# Patient Record
Sex: MALE | Race: BLACK OR AFRICAN AMERICAN | NOT HISPANIC OR LATINO | URBAN - METROPOLITAN AREA
[De-identification: names, ages, dates, MRNs, and addresses within clinical notes are randomized per-mention and may not be internally consistent; named-entity substitution may affect disease eponyms.]

---

## 2021-06-30 ENCOUNTER — APPOINTMENT (RX ONLY)
Dept: URBAN - METROPOLITAN AREA CLINIC 95 | Facility: CLINIC | Age: 26
Setting detail: DERMATOLOGY
End: 2021-06-30

## 2021-06-30 DIAGNOSIS — L28.0 LICHEN SIMPLEX CHRONICUS: ICD-10-CM | Status: INADEQUATELY CONTROLLED

## 2021-06-30 DIAGNOSIS — Z00.8 ENCOUNTER FOR OTHER GENERAL EXAMINATION: ICD-10-CM

## 2021-06-30 PROCEDURE — 99203 OFFICE O/P NEW LOW 30 MIN: CPT

## 2021-06-30 PROCEDURE — ? TREATMENT REGIMEN

## 2021-06-30 PROCEDURE — 99072 ADDL SUPL MATRL&STAF TM PHE: CPT

## 2021-06-30 PROCEDURE — ? PHE RELATED SUPPLIES PROVIDED/DISPENSED

## 2021-06-30 PROCEDURE — ? COUNSELING

## 2021-06-30 PROCEDURE — ? PRESCRIPTION

## 2021-06-30 RX ORDER — CLOBETASOL PROPIONATE 0.5 MG/G
OINTMENT TOPICAL
Qty: 1 | Refills: 1 | Status: ERX

## 2021-06-30 ASSESSMENT — LOCATION ZONE DERM: LOCATION ZONE: LEG

## 2021-06-30 ASSESSMENT — SEVERITY ASSESSMENT: SEVERITY: MODERATE

## 2021-06-30 ASSESSMENT — LOCATION SIMPLE DESCRIPTION DERM
LOCATION SIMPLE: LEFT PRETIBIAL REGION
LOCATION SIMPLE: RIGHT ANKLE

## 2021-06-30 ASSESSMENT — LOCATION DETAILED DESCRIPTION DERM
LOCATION DETAILED: LEFT DISTAL PRETIBIAL REGION
LOCATION DETAILED: RIGHT ANKLE

## 2021-06-30 NOTE — PROCEDURE: TREATMENT REGIMEN
Detail Level: Zone
Initiate Treatment: Clobetasol ointment twice a day on affected areas on lower legs

## 2021-06-30 NOTE — HPI: RASH
What Type Of Note Output Would You Prefer (Optional)?: Standard Output
How Severe Is Your Rash?: moderate
Is This A New Presentation, Or A Follow-Up?: Rash
Additional History: Pt states rash has been going for a while and was given eucrisa, TAC cream and some antibiotics but unsure of name. He states there no improvement.

## 2022-06-23 ENCOUNTER — APPOINTMENT (RX ONLY)
Dept: URBAN - METROPOLITAN AREA CLINIC 94 | Facility: CLINIC | Age: 27
Setting detail: DERMATOLOGY
End: 2022-06-23

## 2022-06-23 DIAGNOSIS — Z00.8 ENCOUNTER FOR OTHER GENERAL EXAMINATION: ICD-10-CM

## 2022-06-23 DIAGNOSIS — L81.0 POSTINFLAMMATORY HYPERPIGMENTATION: ICD-10-CM

## 2022-06-23 DIAGNOSIS — L28.0 LICHEN SIMPLEX CHRONICUS: ICD-10-CM

## 2022-06-23 PROCEDURE — ? PHE RELATED SUPPLIES PROVIDED/DISPENSED

## 2022-06-23 PROCEDURE — ? BIOPSY BY PUNCH METHOD

## 2022-06-23 PROCEDURE — ? PRESCRIPTION

## 2022-06-23 PROCEDURE — ? TREATMENT REGIMEN

## 2022-06-23 PROCEDURE — 11104 PUNCH BX SKIN SINGLE LESION: CPT

## 2022-06-23 PROCEDURE — ? COUNSELING

## 2022-06-23 PROCEDURE — ? SEPARATE AND IDENTIFIABLE DOCUMENTATION

## 2022-06-23 PROCEDURE — 99213 OFFICE O/P EST LOW 20 MIN: CPT | Mod: 25

## 2022-06-23 PROCEDURE — 99072 ADDL SUPL MATRL&STAF TM PHE: CPT

## 2022-06-23 RX ORDER — CLOBETASOL PROPIONATE 0.5 MG/G
OINTMENT TOPICAL
Qty: 60 | Refills: 0 | Status: ERX | COMMUNITY
Start: 2022-06-23

## 2022-06-23 RX ADMIN — CLOBETASOL PROPIONATE: 0.5 OINTMENT TOPICAL at 00:00

## 2022-06-23 ASSESSMENT — LOCATION DETAILED DESCRIPTION DERM
LOCATION DETAILED: RIGHT ANKLE
LOCATION DETAILED: LEFT DISTAL PRETIBIAL REGION
LOCATION DETAILED: RIGHT DISTAL PRETIBIAL REGION

## 2022-06-23 ASSESSMENT — LOCATION ZONE DERM: LOCATION ZONE: LEG

## 2022-06-23 ASSESSMENT — LOCATION SIMPLE DESCRIPTION DERM
LOCATION SIMPLE: RIGHT PRETIBIAL REGION
LOCATION SIMPLE: LEFT PRETIBIAL REGION
LOCATION SIMPLE: RIGHT ANKLE

## 2022-06-23 NOTE — PROCEDURE: BIOPSY BY PUNCH METHOD
Detail Level: Detailed
Was A Bandage Applied: Yes
Punch Size In Mm: 4
Biopsy Type: H and E
Anesthesia Type: 1% lidocaine with epinephrine
Anesthesia Volume In Cc: 0.5
Additional Anesthesia Volume In Cc (Will Not Render If 0): 0
Hemostasis: Dinora's
Epidermal Sutures: 4-0 Nylon
Wound Care: Petrolatum
Dressing: bandage
Suture Removal: 14 days
Patient Will Remove Sutures At Home?: No
Lab: [id number]
Lab Facility: [id number]
Consent: Written consent was obtained and risks were reviewed including but not limited to scarring, infection, bleeding, scabbing, incomplete removal, nerve damage and allergy to anesthesia.
Post-Care Instructions: I reviewed with the patient in detail post-care instructions. Patient is to keep the biopsy site dry overnight, and then apply bacitracin twice daily until healed. Patient may apply hydrogen peroxide soaks to remove any crusting.
Home Suture Removal Text: Patient was provided a home suture removal kit and will remove their sutures at home.  If they have any questions or difficulties they will call the office.
Notification Instructions: Patient will be notified of biopsy results. However, patient instructed to call the office if not contacted within 2 weeks.
Billing Type: Third-Party Bill
Information: Selecting Yes will display possible errors in your note based on the variables you have selected. This validation is only offered as a suggestion for you. PLEASE NOTE THAT THE VALIDATION TEXT WILL BE REMOVED WHEN YOU FINALIZE YOUR NOTE. IF YOU WANT TO FAX A PRELIMINARY NOTE YOU WILL NEED TO TOGGLE THIS TO 'NO' IF YOU DO NOT WANT IT IN YOUR FAXED NOTE.

## 2022-06-23 NOTE — PROCEDURE: TREATMENT REGIMEN
Continue Regimen: Clobetasol ointment twice a day on affected areas on lower legs.
Detail Level: Zone

## 2022-07-11 ENCOUNTER — APPOINTMENT (RX ONLY)
Dept: URBAN - METROPOLITAN AREA CLINIC 94 | Facility: CLINIC | Age: 27
Setting detail: DERMATOLOGY
End: 2022-07-11

## 2022-07-11 DIAGNOSIS — L20.89 OTHER ATOPIC DERMATITIS: ICD-10-CM | Status: INADEQUATELY CONTROLLED

## 2022-07-11 PROBLEM — L30.9 DERMATITIS, UNSPECIFIED: Status: ACTIVE | Noted: 2022-07-11

## 2022-07-11 PROCEDURE — ? PATHOLOGY DISCUSSION

## 2022-07-11 PROCEDURE — ? SUTURE REMOVAL (NO GLOBAL PERIOD)

## 2022-07-11 PROCEDURE — ? TREATMENT REGIMEN

## 2022-07-11 PROCEDURE — ? PRESCRIPTION

## 2022-07-11 PROCEDURE — ? COUNSELING

## 2022-07-11 PROCEDURE — 99213 OFFICE O/P EST LOW 20 MIN: CPT

## 2022-07-11 RX ORDER — BETAMETHASONE DIPROPIONATE 0.5 MG/G
OINTMENT TOPICAL
Qty: 45 | Refills: 0 | Status: ERX | COMMUNITY
Start: 2022-07-11

## 2022-07-11 RX ORDER — CRISABOROLE 20 MG/G
OINTMENT TOPICAL
Qty: 100 | Refills: 0 | Status: ERX | COMMUNITY
Start: 2022-07-11

## 2022-07-11 RX ADMIN — CRISABOROLE: 20 OINTMENT TOPICAL at 00:00

## 2022-07-11 RX ADMIN — BETAMETHASONE DIPROPIONATE: 0.5 OINTMENT TOPICAL at 00:00

## 2022-07-11 ASSESSMENT — LOCATION DETAILED DESCRIPTION DERM
LOCATION DETAILED: RIGHT DISTAL PRETIBIAL REGION
LOCATION DETAILED: RIGHT ANKLE
LOCATION DETAILED: LEFT DISTAL PRETIBIAL REGION

## 2022-07-11 ASSESSMENT — LOCATION SIMPLE DESCRIPTION DERM
LOCATION SIMPLE: RIGHT ANKLE
LOCATION SIMPLE: LEFT PRETIBIAL REGION
LOCATION SIMPLE: RIGHT PRETIBIAL REGION

## 2022-07-11 ASSESSMENT — LOCATION ZONE DERM: LOCATION ZONE: LEG

## 2022-07-11 NOTE — PROCEDURE: TREATMENT REGIMEN
Initiate Treatment: betamethasone dipropionate 0.05 % topical ointment Apply to affected areas on legs twice a day only as needed for itch.\\nEucrisa 2 % topical ointment Apply to mild affected areas on the legs twice a day as needed.
Detail Level: Zone
Plan: If no improvement is seen by next visit discussed initiating dupixent
Discontinue Regimen: Clobetasol ointment

## 2022-07-22 ENCOUNTER — RX ONLY (OUTPATIENT)
Age: 27
Setting detail: RX ONLY
End: 2022-07-22

## 2022-07-22 RX ORDER — TACROLIMUS 1 MG/G
OINTMENT TOPICAL
Qty: 30 | Refills: 0 | Status: ERX | COMMUNITY
Start: 2022-07-22

## 2022-10-27 ENCOUNTER — APPOINTMENT (RX ONLY)
Dept: URBAN - METROPOLITAN AREA CLINIC 94 | Facility: CLINIC | Age: 27
Setting detail: DERMATOLOGY
End: 2022-10-27

## 2022-10-27 VITALS — WEIGHT: 248 LBS | HEIGHT: 68 IN

## 2022-10-27 DIAGNOSIS — L20.89 OTHER ATOPIC DERMATITIS: ICD-10-CM | Status: INADEQUATELY CONTROLLED

## 2022-10-27 PROBLEM — L30.9 DERMATITIS, UNSPECIFIED: Status: ACTIVE | Noted: 2022-10-27

## 2022-10-27 PROCEDURE — ? COUNSELING

## 2022-10-27 PROCEDURE — 99213 OFFICE O/P EST LOW 20 MIN: CPT

## 2022-10-27 PROCEDURE — ? PRESCRIPTION

## 2022-10-27 PROCEDURE — ? TREATMENT REGIMEN

## 2022-10-27 RX ORDER — BETAMETHASONE DIPROPIONATE 0.5 MG/G
OINTMENT TOPICAL
Qty: 45 | Refills: 1 | Status: ERX

## 2022-10-27 RX ORDER — HYDROXYZINE HYDROCHLORIDE 25 MG/1
TABLET, FILM COATED ORAL
Qty: 60 | Refills: 1 | Status: ERX | COMMUNITY
Start: 2022-10-27

## 2022-10-27 RX ADMIN — HYDROXYZINE HYDROCHLORIDE: 25 TABLET, FILM COATED ORAL at 00:00

## 2022-10-27 ASSESSMENT — LOCATION ZONE DERM: LOCATION ZONE: LEG

## 2022-10-27 NOTE — PROCEDURE: TREATMENT REGIMEN
Initiate Treatment: hydroxyzine HCl 25 mg tablet Take 1-2 tablets at bedtime as needed for itch.
Detail Level: Zone
Plan: Recommended pt avoids scratching affected areas as much as possible
Continue Regimen: betamethasone dipropionate 0.05 % topical ointment Apply to affected areas on legs twice a day only as needed for itch.
Discontinue Regimen: Eucrisa 2 % topical ointment Apply to mild affected areas on the legs twice a day as needed.

## 2023-03-02 ENCOUNTER — APPOINTMENT (RX ONLY)
Dept: URBAN - METROPOLITAN AREA CLINIC 94 | Facility: CLINIC | Age: 28
Setting detail: DERMATOLOGY
End: 2023-03-02

## 2023-03-02 VITALS — HEIGHT: 69 IN | WEIGHT: 249.1 LBS

## 2023-03-02 DIAGNOSIS — L20.89 OTHER ATOPIC DERMATITIS: ICD-10-CM

## 2023-03-02 PROBLEM — L30.9 DERMATITIS, UNSPECIFIED: Status: ACTIVE | Noted: 2023-03-02

## 2023-03-02 PROCEDURE — ? TREATMENT REGIMEN

## 2023-03-02 PROCEDURE — ? PRESCRIPTION

## 2023-03-02 PROCEDURE — ? REFERRAL CORRESPONDENCE

## 2023-03-02 PROCEDURE — 99214 OFFICE O/P EST MOD 30 MIN: CPT

## 2023-03-02 PROCEDURE — ? COUNSELING

## 2023-03-02 PROCEDURE — ? ADDITIONAL NOTES

## 2023-03-02 RX ORDER — CLOBETASOL PROPIONATE 0.5 MG/G
OINTMENT TOPICAL
Qty: 45 | Refills: 1 | Status: ERX

## 2023-03-02 ASSESSMENT — LOCATION SIMPLE DESCRIPTION DERM
LOCATION SIMPLE: LEFT PRETIBIAL REGION
LOCATION SIMPLE: RIGHT ANKLE
LOCATION SIMPLE: RIGHT PRETIBIAL REGION

## 2023-03-02 ASSESSMENT — LOCATION DETAILED DESCRIPTION DERM
LOCATION DETAILED: LEFT DISTAL PRETIBIAL REGION
LOCATION DETAILED: RIGHT ANKLE
LOCATION DETAILED: RIGHT DISTAL PRETIBIAL REGION

## 2023-03-02 ASSESSMENT — LOCATION ZONE DERM: LOCATION ZONE: LEG

## 2023-03-02 NOTE — PROCEDURE: TREATMENT REGIMEN
Detail Level: Zone
Plan: Recommended pt avoids scratching affected areas as much as possible
Continue Regimen: clobetasol 0.05 % topical ointment Apply to affected areas on legs twice a day only as needed for itch.
Discontinue Regimen: betamethasone dipropionate 0.05 % topical ointment Apply to affected areas on legs twice a day only as needed for itch.

## 2023-03-02 NOTE — PROCEDURE: ADDITIONAL NOTES
Additional Notes: At Pts request, we will Referral for patch testing
Render Risk Assessment In Note?: yes
Detail Level: Simple
Additional Notes: Discussed initiating Dupixent however pt declines

## 2023-06-22 ENCOUNTER — APPOINTMENT (RX ONLY)
Dept: URBAN - METROPOLITAN AREA CLINIC 94 | Facility: CLINIC | Age: 28
Setting detail: DERMATOLOGY
End: 2023-06-22

## 2023-06-22 VITALS — WEIGHT: 240.97 LBS | HEIGHT: 68 IN

## 2023-06-22 DIAGNOSIS — L20.89 OTHER ATOPIC DERMATITIS: ICD-10-CM | Status: INADEQUATELY CONTROLLED

## 2023-06-22 PROBLEM — L30.9 DERMATITIS, UNSPECIFIED: Status: ACTIVE | Noted: 2023-06-22

## 2023-06-22 PROCEDURE — ? TREATMENT REGIMEN

## 2023-06-22 PROCEDURE — ? COUNSELING

## 2023-06-22 PROCEDURE — ? DUPIXENT INITIATION

## 2023-06-22 PROCEDURE — ? ADDITIONAL NOTES

## 2023-06-22 PROCEDURE — ? REFERRAL CORRESPONDENCE

## 2023-06-22 PROCEDURE — 99214 OFFICE O/P EST MOD 30 MIN: CPT

## 2023-06-22 PROCEDURE — ? PRESCRIPTION

## 2023-06-22 RX ORDER — CLOBETASOL PROPIONATE 0.5 MG/G
OINTMENT TOPICAL
Qty: 45 | Refills: 0 | Status: ERX

## 2023-06-22 RX ORDER — CRISABOROLE 20 MG/G
OINTMENT TOPICAL
Qty: 100 | Refills: 0 | Status: ERX

## 2023-06-22 ASSESSMENT — LOCATION SIMPLE DESCRIPTION DERM
LOCATION SIMPLE: RIGHT PRETIBIAL REGION
LOCATION SIMPLE: RIGHT ANKLE
LOCATION SIMPLE: LEFT PRETIBIAL REGION

## 2023-06-22 ASSESSMENT — SEVERITY ASSESSMENT 2020: SEVERITY 2020: MODERATE

## 2023-06-22 ASSESSMENT — LOCATION DETAILED DESCRIPTION DERM
LOCATION DETAILED: RIGHT ANKLE
LOCATION DETAILED: RIGHT DISTAL PRETIBIAL REGION
LOCATION DETAILED: LEFT DISTAL PRETIBIAL REGION

## 2023-06-22 ASSESSMENT — BSA ECZEMA: % BODY COVERED IN ECZEMA: 15

## 2023-06-22 ASSESSMENT — ITCH NUMERIC RATING SCALE: HOW SEVERE IS YOUR ITCHING?: 6

## 2023-06-22 ASSESSMENT — LOCATION ZONE DERM: LOCATION ZONE: LEG

## 2023-06-22 NOTE — PROCEDURE: DUPIXENT INITIATION
Is Soriatane Contraindicated?: No
Dupixent Dosing: 600 mg SC day 0 then 300 mg SC every other week
Pregnancy And Lactation Warning Text: There have not been adverse fetal risks in women taking Dupixent while pregnant. It is unknown if this medication is excreted in breast milk.
Dupixent Monitoring Guidelines: There is no laboratory monitoring requirement with Dupixent.
Diagnosis (Required): Atopic Dermatitis/Eczematous Dermatitis
Detail Level: Zone

## 2023-06-22 NOTE — PROCEDURE: TREATMENT REGIMEN
Detail Level: Zone
Plan: Recommended pt avoids scratching affected areas as much as possible
Continue Regimen: clobetasol 0.05 % topical ointment Apply to affected areas on legs twice a day only as needed for itch.
Discontinue Regimen: betamethasone dipropionate 0.05 % topical ointment Apply to affected areas on legs twice a day only as needed for itch.
Initiate Treatment: Dupixent: \\nInitial loading dose: 600 mg SI (300 mg/2 mL) injections subcutaneously on Day 1 \\nMaintenance dose: 300 mg SI (300 mg/2 mL) injection subcutaneously every 2 weeks, starting on Day 15
Continue Regimen: clobetasol 0.05 % topical: Apply to affected areas on legs twice a day only as needed for itch.\\n\\nEucrisa 2 % topical ointment: 1 application twice a day to mild affected areas on legs as needed for itch.

## 2023-06-22 NOTE — PROCEDURE: ADDITIONAL NOTES
Additional Notes: Referred to Dr. Armas for patch testing
Render Risk Assessment In Note?: yes
Detail Level: Simple
Additional Notes: Atopic dermatitis affects patient quality of life by interfering with sleep and work due to constant itchiness and cracking of skin.\\nBSA: 15%\\nPGA: moderate.

## 2023-06-26 ENCOUNTER — RX ONLY (OUTPATIENT)
Age: 28
Setting detail: RX ONLY
End: 2023-06-26

## 2023-06-26 RX ORDER — DUPILUMAB 300 MG/2ML
INJECTION, SOLUTION SUBCUTANEOUS
Qty: 4 | Refills: 0 | Status: ERX

## 2023-06-26 RX ORDER — DUPILUMAB 300 MG/2ML
INJECTION, SOLUTION SUBCUTANEOUS
Qty: 1 | Refills: 4 | Status: ERX | COMMUNITY
Start: 2023-06-26

## 2023-08-07 ENCOUNTER — RX ONLY (OUTPATIENT)
Age: 28
Setting detail: RX ONLY
End: 2023-08-07

## 2023-08-07 RX ORDER — CLOBETASOL PROPIONATE 0.5 MG/G
OINTMENT TOPICAL
Qty: 45 | Refills: 0 | Status: ERX

## 2024-02-05 ENCOUNTER — APPOINTMENT (RX ONLY)
Dept: URBAN - METROPOLITAN AREA CLINIC 94 | Facility: CLINIC | Age: 29
Setting detail: DERMATOLOGY
End: 2024-02-05

## 2024-02-05 VITALS — HEIGHT: 68.5 IN | WEIGHT: 233 LBS

## 2024-02-05 DIAGNOSIS — L259 CONTACT DERMATITIS AND OTHER ECZEMA, UNSPECIFIED CAUSE: ICD-10-CM | Status: INADEQUATELY CONTROLLED

## 2024-02-05 PROBLEM — L30.8 OTHER SPECIFIED DERMATITIS: Status: ACTIVE | Noted: 2024-02-05

## 2024-02-05 PROCEDURE — ? DUPIXENT INITIATION

## 2024-02-05 PROCEDURE — ? TREATMENT REGIMEN

## 2024-02-05 PROCEDURE — ? PRESCRIPTION

## 2024-02-05 PROCEDURE — 99213 OFFICE O/P EST LOW 20 MIN: CPT

## 2024-02-05 PROCEDURE — ? COUNSELING

## 2024-02-05 RX ORDER — CLOBETASOL PROPIONATE 0.5 MG/G
OINTMENT TOPICAL
Qty: 60 | Refills: 0 | Status: ERX

## 2024-02-05 RX ORDER — TACROLIMUS 1 MG/G
OINTMENT TOPICAL
Qty: 60 | Refills: 0 | Status: ERX | COMMUNITY
Start: 2024-02-05

## 2024-02-05 RX ADMIN — TACROLIMUS: 1 OINTMENT TOPICAL at 00:00

## 2024-02-05 ASSESSMENT — LOCATION DETAILED DESCRIPTION DERM
LOCATION DETAILED: RIGHT DISTAL PRETIBIAL REGION
LOCATION DETAILED: LEFT PROXIMAL PRETIBIAL REGION
LOCATION DETAILED: LEFT DISTAL PRETIBIAL REGION
LOCATION DETAILED: RIGHT PROXIMAL PRETIBIAL REGION

## 2024-02-05 ASSESSMENT — LOCATION ZONE DERM
LOCATION ZONE: LEG
LOCATION ZONE: LEG

## 2024-02-05 ASSESSMENT — LOCATION SIMPLE DESCRIPTION DERM
LOCATION SIMPLE: RIGHT PRETIBIAL REGION
LOCATION SIMPLE: LEFT PRETIBIAL REGION
LOCATION SIMPLE: LEFT PRETIBIAL REGION
LOCATION SIMPLE: RIGHT PRETIBIAL REGION

## 2024-02-05 ASSESSMENT — ITCH NUMERIC RATING SCALE: HOW SEVERE IS YOUR ITCHING?: 2

## 2024-02-05 ASSESSMENT — BSA RASH: BSA RASH: 15

## 2024-02-05 ASSESSMENT — PAIN INTENSITY VAS: HOW INTENSE IS YOUR PAIN 0 BEING NO PAIN, 10 BEING THE MOST SEVERE PAIN POSSIBLE?: NO PAIN

## 2024-02-05 ASSESSMENT — SEVERITY ASSESSMENT 2020: SEVERITY 2020: SEVERE

## 2024-02-05 NOTE — PROCEDURE: TREATMENT REGIMEN
Initiate Treatment: clobetasol 0.05 % topical ointment : Apply to AA on legs BID x 2 weeks. Take a 2 week break then use PRN for flares.\\n\\ntacrolimus 0.1 % topical ointment : Apply to AA BID
Detail Level: Zone

## 2024-02-05 NOTE — PROCEDURE: DUPIXENT INITIATION
Is Thalidomide Contraindicated?: No
Detail Level: Zone
Dupixent Monitoring Guidelines: There is no laboratory monitoring requirement with Dupixent.
Dupixent Dosing: 600 mg SC day 0 then 300 mg SC every other week
Pregnancy And Lactation Warning Text: There have not been adverse fetal risks in women taking Dupixent while pregnant. It is unknown if this medication is excreted in breast milk.
Diagnosis (Required): Atopic Dermatitis/Eczematous Dermatitis

## 2024-03-08 ENCOUNTER — APPOINTMENT (RX ONLY)
Dept: URBAN - METROPOLITAN AREA CLINIC 94 | Facility: CLINIC | Age: 29
Setting detail: DERMATOLOGY
End: 2024-03-08

## 2024-03-08 VITALS — WEIGHT: 233 LBS | HEIGHT: 68 IN

## 2024-03-08 DIAGNOSIS — L20.89 OTHER ATOPIC DERMATITIS: ICD-10-CM | Status: INADEQUATELY CONTROLLED

## 2024-03-08 PROCEDURE — 99214 OFFICE O/P EST MOD 30 MIN: CPT | Mod: 25

## 2024-03-08 PROCEDURE — ? ADDITIONAL NOTES

## 2024-03-08 PROCEDURE — ? DUPIXENT INITIATION

## 2024-03-08 PROCEDURE — ? DUPIXENT INJECTION

## 2024-03-08 PROCEDURE — ? SEPARATE AND IDENTIFIABLE DOCUMENTATION

## 2024-03-08 PROCEDURE — ? COUNSELING

## 2024-03-08 PROCEDURE — 96372 THER/PROPH/DIAG INJ SC/IM: CPT

## 2024-03-08 ASSESSMENT — LOCATION DETAILED DESCRIPTION DERM
LOCATION DETAILED: PERIUMBILICAL SKIN
LOCATION DETAILED: LEFT DISTAL PRETIBIAL REGION
LOCATION DETAILED: RIGHT DORSAL FOOT
LOCATION DETAILED: LEFT DORSAL FOOT
LOCATION DETAILED: RIGHT DISTAL PRETIBIAL REGION
LOCATION DETAILED: LEFT DISTAL PRETIBIAL REGION
LOCATION DETAILED: RIGHT DISTAL PRETIBIAL REGION
LOCATION DETAILED: LEFT ANKLE
LOCATION DETAILED: RIGHT ANKLE

## 2024-03-08 ASSESSMENT — LOCATION SIMPLE DESCRIPTION DERM
LOCATION SIMPLE: LEFT ANKLE
LOCATION SIMPLE: RIGHT PRETIBIAL REGION
LOCATION SIMPLE: RIGHT FOOT
LOCATION SIMPLE: RIGHT ANKLE
LOCATION SIMPLE: LEFT PRETIBIAL REGION
LOCATION SIMPLE: RIGHT PRETIBIAL REGION
LOCATION SIMPLE: ABDOMEN
LOCATION SIMPLE: LEFT PRETIBIAL REGION
LOCATION SIMPLE: LEFT FOOT

## 2024-03-08 ASSESSMENT — LOCATION ZONE DERM
LOCATION ZONE: LEG
LOCATION ZONE: TRUNK
LOCATION ZONE: LEG
LOCATION ZONE: FEET

## 2024-03-08 ASSESSMENT — ITCH NUMERIC RATING SCALE: HOW SEVERE IS YOUR ITCHING?: 9

## 2024-03-08 ASSESSMENT — SEVERITY ASSESSMENT 2020: SEVERITY 2020: MODERATE

## 2024-03-08 ASSESSMENT — BSA ECZEMA: % BODY COVERED IN ECZEMA: 20

## 2024-03-08 NOTE — PROCEDURE: DUPIXENT INJECTION
Expiration Date (Optional): 11/30/2025
Additional Comments: Samples provided to patient.
Ndc (200 Mg Prefilled Syringe): [address]-75
Consent: The risks of pain and injection site reactions were reviewed with the patient prior to the injection.
Ndc (200 Mg Prefilled Pen): 2322-8801-81
Date Of Next Injection: 2 Weeks
Include J-Code In Bill: Yes
Dupixent Dosing: 600 mg
Syringe Size Used (Required For Enhanced Ndc): 300 mg/2ml prefilled pen
Hide Non-Enhanced Ndc Variable: No
J-Code: 
Treatment Number (Optional): 1
Detail Level: None
Lot # (Optional): [id number]
Administered By (Optional): Dalton Solis PA-C
Ndc (300 Mg Prefilled Syringe): [address]-66

## 2024-03-08 NOTE — PROCEDURE: ADDITIONAL NOTES
Render Risk Assessment In Note?: no
Detail Level: Simple
Additional Notes: Patient provided with one additional pen to take at home in two weeks.\\nLot: [id number]\\nExpiration: 10/31/2025

## 2024-03-13 ENCOUNTER — RX ONLY (OUTPATIENT)
Age: 29
Setting detail: RX ONLY
End: 2024-03-13

## 2024-03-13 RX ORDER — DUPILUMAB 300 MG/2ML
INJECTION, SOLUTION SUBCUTANEOUS
Qty: 4 | Refills: 5 | Status: ERX | COMMUNITY
Start: 2024-03-13

## 2024-04-08 ENCOUNTER — APPOINTMENT (RX ONLY)
Dept: URBAN - METROPOLITAN AREA CLINIC 94 | Facility: CLINIC | Age: 29
Setting detail: DERMATOLOGY
End: 2024-04-08

## 2024-04-08 VITALS — HEIGHT: 68 IN | WEIGHT: 233 LBS

## 2024-04-08 DIAGNOSIS — L20.89 OTHER ATOPIC DERMATITIS: ICD-10-CM | Status: IMPROVED

## 2024-04-08 PROCEDURE — 99214 OFFICE O/P EST MOD 30 MIN: CPT

## 2024-04-08 PROCEDURE — ? COUNSELING

## 2024-04-08 PROCEDURE — ? DUPIXENT MONITORING

## 2024-04-08 ASSESSMENT — LOCATION SIMPLE DESCRIPTION DERM
LOCATION SIMPLE: LEFT PRETIBIAL REGION
LOCATION SIMPLE: RIGHT FOOT
LOCATION SIMPLE: RIGHT PRETIBIAL REGION
LOCATION SIMPLE: LEFT PRETIBIAL REGION
LOCATION SIMPLE: RIGHT PRETIBIAL REGION
LOCATION SIMPLE: RIGHT ANKLE
LOCATION SIMPLE: LEFT FOOT
LOCATION SIMPLE: LEFT ANKLE

## 2024-04-08 ASSESSMENT — LOCATION DETAILED DESCRIPTION DERM
LOCATION DETAILED: LEFT DISTAL PRETIBIAL REGION
LOCATION DETAILED: RIGHT DISTAL PRETIBIAL REGION
LOCATION DETAILED: LEFT ANKLE
LOCATION DETAILED: LEFT DISTAL PRETIBIAL REGION
LOCATION DETAILED: RIGHT ANKLE
LOCATION DETAILED: RIGHT DORSAL FOOT
LOCATION DETAILED: RIGHT DISTAL PRETIBIAL REGION
LOCATION DETAILED: LEFT DORSAL FOOT

## 2024-04-08 ASSESSMENT — BSA ECZEMA: % BODY COVERED IN ECZEMA: 19

## 2024-04-08 ASSESSMENT — ITCH NUMERIC RATING SCALE: HOW SEVERE IS YOUR ITCHING?: 6

## 2024-04-08 ASSESSMENT — LOCATION ZONE DERM
LOCATION ZONE: LEG
LOCATION ZONE: FEET
LOCATION ZONE: LEG

## 2024-04-08 ASSESSMENT — SEVERITY ASSESSMENT 2020: SEVERITY 2020: MODERATE

## 2024-04-08 NOTE — PROCEDURE: MIPS QUALITY
Quality 486: Dermatitis - Improvement In Patient-Reported Itch Severity: Itch severity assessment score is reduced by 3 or more points from the initial (index) assessment score to the follow-up visit score
Detail Level: Detailed
Quality 226: Preventive Care And Screening: Tobacco Use: Screening And Cessation Intervention: Patient screened for tobacco use and is an ex/non-smoker

## 2024-07-08 ENCOUNTER — APPOINTMENT (RX ONLY)
Dept: URBAN - METROPOLITAN AREA CLINIC 94 | Facility: CLINIC | Age: 29
Setting detail: DERMATOLOGY
End: 2024-07-08

## 2024-07-08 VITALS — HEIGHT: 68 IN | WEIGHT: 194 LBS

## 2024-07-08 DIAGNOSIS — L20.89 OTHER ATOPIC DERMATITIS: ICD-10-CM | Status: IMPROVED

## 2024-07-08 PROCEDURE — ? COUNSELING

## 2024-07-08 PROCEDURE — ? TREATMENT REGIMEN

## 2024-07-08 PROCEDURE — ? DUPIXENT MONITORING

## 2024-07-08 PROCEDURE — 99214 OFFICE O/P EST MOD 30 MIN: CPT

## 2024-07-08 ASSESSMENT — LOCATION DETAILED DESCRIPTION DERM
LOCATION DETAILED: RIGHT DISTAL PRETIBIAL REGION
LOCATION DETAILED: RIGHT DISTAL PRETIBIAL REGION
LOCATION DETAILED: RIGHT DORSAL FOOT
LOCATION DETAILED: LEFT DORSAL FOOT
LOCATION DETAILED: LEFT DISTAL PRETIBIAL REGION
LOCATION DETAILED: LEFT DISTAL PRETIBIAL REGION
LOCATION DETAILED: LEFT ANKLE
LOCATION DETAILED: RIGHT ANKLE

## 2024-07-08 ASSESSMENT — BSA ECZEMA: % BODY COVERED IN ECZEMA: 2

## 2024-07-08 ASSESSMENT — LOCATION ZONE DERM
LOCATION ZONE: LEG
LOCATION ZONE: LEG
LOCATION ZONE: FEET

## 2024-07-08 ASSESSMENT — SEVERITY ASSESSMENT 2020: SEVERITY 2020: MILD

## 2024-07-08 ASSESSMENT — LOCATION SIMPLE DESCRIPTION DERM
LOCATION SIMPLE: LEFT ANKLE
LOCATION SIMPLE: RIGHT ANKLE
LOCATION SIMPLE: LEFT PRETIBIAL REGION
LOCATION SIMPLE: RIGHT FOOT
LOCATION SIMPLE: RIGHT PRETIBIAL REGION
LOCATION SIMPLE: LEFT PRETIBIAL REGION
LOCATION SIMPLE: RIGHT PRETIBIAL REGION
LOCATION SIMPLE: LEFT FOOT

## 2024-07-08 ASSESSMENT — ITCH NUMERIC RATING SCALE: HOW SEVERE IS YOUR ITCHING?: 0

## 2024-07-08 NOTE — PROCEDURE: TREATMENT REGIMEN
Continue Regimen: Dupixent\\nClobetasol or Protopic oint bid during breakthrough flares
Detail Level: Zone

## 2024-07-08 NOTE — PROCEDURE: MIPS QUALITY
Quality 486: Dermatitis - Improvement In Patient-Reported Itch Severity: Itch severity assessment score is reduced by 3 or more points from the initial (index) assessment score to the follow-up visit score
Quality 176: Tuberculosis Screening Prior To First Course Of Biologic And/Or Immune Response Modifier Therapy: Patient receiving first-time biologic and/or immune response modifier therapy, medical reason for not screening for TB or interpreting results (i.e., patient has recently completed a course of anti-TB therapy)
Detail Level: Detailed
Quality 226: Preventive Care And Screening: Tobacco Use: Screening And Cessation Intervention: Patient screened for tobacco use and is an ex/non-smoker

## 2024-07-08 NOTE — PROCEDURE: DUPIXENT MONITORING
Add High Risk Medication Management Associated Diagnosis?: Yes
Length Of Therapy: 4 months
Detail Level: Zone

## 2024-07-09 ENCOUNTER — RX ONLY (OUTPATIENT)
Age: 29
Setting detail: RX ONLY
End: 2024-07-09

## 2024-07-09 RX ORDER — DUPILUMAB 300 MG/2ML
INJECTION, SOLUTION SUBCUTANEOUS
Qty: 4 | Refills: 5 | Status: ERX

## 2024-10-21 ENCOUNTER — APPOINTMENT (RX ONLY)
Dept: URBAN - METROPOLITAN AREA CLINIC 94 | Facility: CLINIC | Age: 29
Setting detail: DERMATOLOGY
End: 2024-10-21

## 2024-10-21 VITALS — HEIGHT: 68 IN | WEIGHT: 225 LBS

## 2024-10-21 DIAGNOSIS — L20.89 OTHER ATOPIC DERMATITIS: ICD-10-CM | Status: STABLE

## 2024-10-21 PROCEDURE — ? TREATMENT REGIMEN

## 2024-10-21 PROCEDURE — ? DUPIXENT MONITORING

## 2024-10-21 PROCEDURE — 99213 OFFICE O/P EST LOW 20 MIN: CPT

## 2024-10-21 PROCEDURE — ? COUNSELING

## 2024-10-21 ASSESSMENT — SEVERITY ASSESSMENT 2020: SEVERITY 2020: MODERATE

## 2024-10-21 ASSESSMENT — LOCATION SIMPLE DESCRIPTION DERM
LOCATION SIMPLE: RIGHT PRETIBIAL REGION
LOCATION SIMPLE: LEFT ANKLE
LOCATION SIMPLE: RIGHT ANKLE
LOCATION SIMPLE: LEFT PRETIBIAL REGION
LOCATION SIMPLE: LEFT PRETIBIAL REGION
LOCATION SIMPLE: LEFT FOOT
LOCATION SIMPLE: RIGHT PRETIBIAL REGION
LOCATION SIMPLE: RIGHT FOOT

## 2024-10-21 ASSESSMENT — LOCATION DETAILED DESCRIPTION DERM
LOCATION DETAILED: LEFT ANKLE
LOCATION DETAILED: RIGHT DISTAL PRETIBIAL REGION
LOCATION DETAILED: LEFT DISTAL PRETIBIAL REGION
LOCATION DETAILED: RIGHT DORSAL FOOT
LOCATION DETAILED: RIGHT ANKLE
LOCATION DETAILED: LEFT DORSAL FOOT
LOCATION DETAILED: RIGHT DISTAL PRETIBIAL REGION
LOCATION DETAILED: LEFT DISTAL PRETIBIAL REGION

## 2024-10-21 ASSESSMENT — LOCATION ZONE DERM
LOCATION ZONE: LEG
LOCATION ZONE: FEET
LOCATION ZONE: LEG

## 2024-10-21 ASSESSMENT — ITCH NUMERIC RATING SCALE: HOW SEVERE IS YOUR ITCHING?: 0

## 2024-10-21 ASSESSMENT — BSA ECZEMA: % BODY COVERED IN ECZEMA: 5

## 2024-10-21 NOTE — PROCEDURE: DUPIXENT MONITORING
Add High Risk Medication Management Associated Diagnosis?: Yes
Length Of Therapy: 9 months
Detail Level: Zone

## 2024-10-21 NOTE — PROCEDURE: TREATMENT REGIMEN
Detail Level: Zone
Modify Regimen: -restart Clobetasol or Protopic oint or Eucrisa bid during breakthrough flares\\n\\n-restart Dupixent-pt stopped after c/o dizziness. He states this was an isolated incident. Advised him to discuss with his pcp and have work up done to rule out their causes. Pt notes it correlates with working out at the gym and not the injection. He will restart dupixent as he is flaring on face. Pt can stick to topicals if uncomfortable with dupixent. We also discussed alternative biologics we could try,

## 2025-02-10 ENCOUNTER — APPOINTMENT (OUTPATIENT)
Dept: URBAN - METROPOLITAN AREA CLINIC 94 | Facility: CLINIC | Age: 30
Setting detail: DERMATOLOGY
End: 2025-02-10

## 2025-02-10 VITALS — HEIGHT: 68 IN | WEIGHT: 230 LBS

## 2025-02-10 DIAGNOSIS — L20.89 OTHER ATOPIC DERMATITIS: ICD-10-CM | Status: INADEQUATELY CONTROLLED

## 2025-02-10 PROCEDURE — ? PRESCRIPTION

## 2025-02-10 PROCEDURE — 99213 OFFICE O/P EST LOW 20 MIN: CPT

## 2025-02-10 PROCEDURE — ? TREATMENT REGIMEN

## 2025-02-10 PROCEDURE — ? DUPIXENT MONITORING

## 2025-02-10 PROCEDURE — ? COUNSELING

## 2025-02-10 RX ORDER — HYDROCORTISONE 25 MG/G
OINTMENT TOPICAL
Qty: 28.35 | Refills: 0 | Status: ERX | COMMUNITY
Start: 2025-02-10

## 2025-02-10 RX ADMIN — HYDROCORTISONE: 25 OINTMENT TOPICAL at 00:00

## 2025-02-10 ASSESSMENT — LOCATION DETAILED DESCRIPTION DERM
LOCATION DETAILED: RIGHT DISTAL PRETIBIAL REGION
LOCATION DETAILED: LEFT DISTAL PRETIBIAL REGION
LOCATION DETAILED: LEFT ANKLE
LOCATION DETAILED: RIGHT ANKLE
LOCATION DETAILED: LEFT DORSAL FOOT
LOCATION DETAILED: RIGHT DORSAL FOOT
LOCATION DETAILED: RIGHT DISTAL PRETIBIAL REGION
LOCATION DETAILED: LEFT DISTAL PRETIBIAL REGION

## 2025-02-10 ASSESSMENT — LOCATION SIMPLE DESCRIPTION DERM
LOCATION SIMPLE: RIGHT FOOT
LOCATION SIMPLE: LEFT ANKLE
LOCATION SIMPLE: RIGHT PRETIBIAL REGION
LOCATION SIMPLE: RIGHT ANKLE
LOCATION SIMPLE: RIGHT PRETIBIAL REGION
LOCATION SIMPLE: LEFT PRETIBIAL REGION
LOCATION SIMPLE: LEFT PRETIBIAL REGION
LOCATION SIMPLE: LEFT FOOT

## 2025-02-10 ASSESSMENT — ITCH NUMERIC RATING SCALE: HOW SEVERE IS YOUR ITCHING?: 2

## 2025-02-10 ASSESSMENT — LOCATION ZONE DERM
LOCATION ZONE: FEET
LOCATION ZONE: LEG
LOCATION ZONE: LEG

## 2025-02-10 ASSESSMENT — SEVERITY ASSESSMENT 2020: SEVERITY 2020: MODERATE

## 2025-02-10 ASSESSMENT — BSA ECZEMA: % BODY COVERED IN ECZEMA: 1

## 2025-02-10 NOTE — PROCEDURE: TREATMENT REGIMEN
Initiate Treatment: hydrocortisone 2.5 % topical ointment : Apply to eczema on face/neck areas qd to bid prn for up to two weeks per month for itching only
Detail Level: Zone
Modify Regimen: -restart Clobetasol on body  during breakthrough flares\\n\\n-restart Dupixent-pt stopped after c/o dizziness. He states this was an isolated incident. Advised him to discuss with his pcp and have work up done to rule out their causes. Pt notes it correlates with working out at the gym and not the injection. He will restart dupixent as he is flaring on face. Pt can stick to topicals if uncomfortable with dupixent. We also discussed alternative biologics we could try,

## 2025-05-12 ENCOUNTER — APPOINTMENT (OUTPATIENT)
Dept: URBAN - METROPOLITAN AREA CLINIC 94 | Facility: CLINIC | Age: 30
Setting detail: DERMATOLOGY
End: 2025-05-12

## 2025-05-12 VITALS — HEIGHT: 68 IN | WEIGHT: 260 LBS
